# Patient Record
Sex: FEMALE | Race: WHITE | NOT HISPANIC OR LATINO
[De-identification: names, ages, dates, MRNs, and addresses within clinical notes are randomized per-mention and may not be internally consistent; named-entity substitution may affect disease eponyms.]

---
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
## 2019-01-02 LAB
T4 FREE SERPL-MCNC: 1.1 NG/DL
THYROPEROXIDASE AB SERPL IA-ACNC: <10 IU/ML
TSH SERPL-ACNC: 4.75 UIU/ML

## 2019-06-11 ENCOUNTER — RESULT REVIEW (OUTPATIENT)
Age: 19
End: 2019-06-11

## 2019-12-20 ENCOUNTER — TRANSCRIPTION ENCOUNTER (OUTPATIENT)
Age: 19
End: 2019-12-20

## 2019-12-24 ENCOUNTER — APPOINTMENT (OUTPATIENT)
Dept: ENDOCRINOLOGY | Facility: CLINIC | Age: 19
End: 2019-12-24
Payer: COMMERCIAL

## 2019-12-24 VITALS
SYSTOLIC BLOOD PRESSURE: 110 MMHG | BODY MASS INDEX: 24.07 KG/M2 | OXYGEN SATURATION: 99 % | WEIGHT: 141 LBS | HEART RATE: 91 BPM | DIASTOLIC BLOOD PRESSURE: 70 MMHG | HEIGHT: 64 IN

## 2019-12-24 LAB
ACTH SER-ACNC: 6.6 PG/ML
CORTIS SERPL-MCNC: 18.7 UG/DL
FSH SERPL-MCNC: 1.7 IU/L
IGF-1 INTERP: NORMAL
IGF-I BLD-MCNC: 280 NG/ML
LH SERPL-ACNC: 1.4 IU/L
PROLACTIN SERPL-MCNC: 9.8 NG/ML
T4 FREE SERPL-MCNC: 1.1 NG/DL
TSH SERPL-ACNC: 2.2 UIU/ML

## 2019-12-24 PROCEDURE — 99213 OFFICE O/P EST LOW 20 MIN: CPT

## 2019-12-24 NOTE — HISTORY OF PRESENT ILLNESS
[FreeTextEntry1] : Ms. SIMONE HENSLEY is a 18 year old female here for evaluation of pituitary microadenoma.  This was found in MRI in Agus 3, 2018.  MRI done for irregular menstrual cycle. Her first menses started around 11 age, had been mostly regular.  Around Aug of 2017, started getting irregular menses, in which she described that menstrual period was a about 2 weeks late, and in Oct she got it twice a month.  She does not take any medication.  She states that there's no external stress factors.  \par Went to see Dr. Conn, found to have low LH, FSH level and workup included the MRI-pitutiary.  \par \par Brain imaging revealed: Jan 2018, 4.3 x 4.3 mm and could be  compatible with a small pituitary microadenoma versus benign cystic lesion. Repeat imaging was done in December 2018.  Previously noted area of decreased enhancement involving the RIGHT sella turcica as considerably diminished in size.  This finding currently measures approximately 2.3 x 1.6 mm and previously measured approximately 4.3 x 4.3 mm.  He pituitary stalk essentially located and appeared normal.\par \par She denies headache or change of vision. \par \par Symptoms of over or under secretion of pituitary hormones: She noticed no change in her shoe or ring size, no excessive weight gain/loss recently.  She noticed no hirsutism. \par \par Associated visual or neurological symptoms: No headache, no loss of peripheral vision. \par \par Laboratory workup reveled: normal PRL, no IGF-1, no 24 hour urine cortisol/creatinine, AM cortisol/ACTH, normal TSH\par \par She was started on OCP and since then has regular menstrual period. 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  She is studying Kinesiology. \par

## 2019-12-24 NOTE — ASSESSMENT
[FreeTextEntry1] : 1)Pituitary microadenoma\par \par Discuss in detail with patient regarding hormonal testing for pituitary incidentaloma (adenoma) for hyper and hyposecretion.  Discussed the indication for surgical therapy of the pituitary incidentaloma including visual field deficit, other visual abnormalities, lesions abutting or compressing the optic nerves or chiasm on MRI, pituitary apoplexy with visual disturbances as well as hypersecreting tumors other than prolactinoma.  \par Most recent MRI done this year showed lesion in the pituitary gland smaller (2.3x1.6mm vs. 4.3x4.3mm). \par We will repeat the following pituitary panel once a year.\par -Check prolactin, IGF1, and salivary cortisol. \par -Check TSH/Free T4\par -Return in one year for follow up/ \par \par \par \par

## 2019-12-24 NOTE — PHYSICAL EXAM
[Alert] : alert [Well Nourished] : well nourished [No Acute Distress] : no acute distress [EOMI] : extra ocular movement intact [Well Developed] : well developed [Normal Sclera/Conjunctiva] : normal sclera/conjunctiva [No Proptosis] : no proptosis [Normal Oropharynx] : the oropharynx was normal [No Thyroid Nodules] : there were no palpable thyroid nodules [Thyroid Not Enlarged] : the thyroid was not enlarged [No Accessory Muscle Use] : no accessory muscle use [No Respiratory Distress] : no respiratory distress [Clear to Auscultation] : lungs were clear to auscultation bilaterally [Normal S1, S2] : normal S1 and S2 [Normal Rate] : heart rate was normal  [Regular Rhythm] : with a regular rhythm [Pedal Pulses Normal] : the pedal pulses are present [No Edema] : there was no peripheral edema [Normal Bowel Sounds] : normal bowel sounds [Soft] : abdomen soft [Not Tender] : non-tender [Not Distended] : not distended [Post Cervical Nodes] : posterior cervical nodes [Anterior Cervical Nodes] : anterior cervical nodes [Axillary Nodes] : axillary nodes [Normal] : normal and non tender [Spine Straight] : spine straight [No Spinal Tenderness] : no spinal tenderness [Normal Gait] : normal gait [No Stigmata of Cushings Syndrome] : no stigmata of cushings syndrome [Normal Strength/Tone] : muscle strength and tone were normal [No Rash] : no rash [Acanthosis Nigricans] : no acanthosis nigricans [Normal Reflexes] : deep tendon reflexes were 2+ and symmetric [No Tremors] : no tremors [Oriented x3] : oriented to person, place, and time

## 2021-03-05 ENCOUNTER — APPOINTMENT (OUTPATIENT)
Dept: ENDOCRINOLOGY | Facility: CLINIC | Age: 21
End: 2021-03-05
Payer: COMMERCIAL

## 2021-03-05 VITALS
HEART RATE: 77 BPM | SYSTOLIC BLOOD PRESSURE: 116 MMHG | DIASTOLIC BLOOD PRESSURE: 80 MMHG | TEMPERATURE: 98.1 F | WEIGHT: 145 LBS | OXYGEN SATURATION: 98 %

## 2021-03-05 PROCEDURE — 99214 OFFICE O/P EST MOD 30 MIN: CPT

## 2021-03-05 PROCEDURE — 99072 ADDL SUPL MATRL&STAF TM PHE: CPT

## 2021-03-05 RX ORDER — DESOGESTREL AND ETHINYL ESTRADIOL 0.15-0.03
0.15-3 KIT ORAL DAILY
Qty: 3 | Refills: 3 | Status: DISCONTINUED | COMMUNITY
Start: 2018-01-08 | End: 2021-03-05

## 2021-03-05 NOTE — DATA REVIEWED
[FreeTextEntry1] : February 2021\par TSH 2.79\par ACTH 15\par Free T4 1.0\par FSH 6.6\par LH 10.5\par Prolactin 7.8\par A.m. cortisol 14.2.\par \par 12/27/2017\par TSH 1.04\par PROLACTIN 7.5\par HCG <2\par FSH 1.4 MIU/ML\par Lh 0.9 uiu/ml\par \par EXAM: MR BRAIN WAW IC \par \par \par PROCEDURE DATE: 01/03/2018 \par \par INTERPRETATION: History: Irregular menses. \par \par MRI of the sella turcica was performed using coronal and sagittal \par T1-weighted sequence. Coronal T2-weighted sequence films well. Axial T2 and \par FLAIR sequences performed through the brain. The patient was injected with \par approximately 3.5 cc of Gadavist IV with 4 cc of conscious discarded. \par Coronal and sagittal T1-weighted sequence was performed. Axial T1-weighted \par sequence was performed through the brain. \par \par A normal posterior pituitary bright spot is seen. \par \par There is evidence of decreased enhancement seen involving the right \par pituitary gland. This seen posteriorly and is best seen on series 10 images \par 26 and his finding measures approximately 4.3 x 4.3 mm and could be \par compatible with a small pituitary microadenoma versus benign cystic lesion. \par \par The pituitary stalk is centrally located and appears normal.

## 2021-03-05 NOTE — HISTORY OF PRESENT ILLNESS
[FreeTextEntry1] : Ms. SIMONE HENSLEY is a 21 year old female here for evaluation of pituitary microadenoma.  This was found in MRI in Agus 3, 2018.  MRI done for irregular menstrual cycle. Her first menses started around 11 age, had been mostly regular.  Around Aug of 2017, started getting irregular menses, in which she described that menstrual period was a about 2 weeks late, and in Oct she got it twice a month.  She does not take any medication.  She states that there's no external stress factors.  \par Went to see Dr. Conn, found to have low LH, FSH level and workup included the MRI-pitutiary.  \par \par Brain imaging revealed: Jan 2018, 4.3 x 4.3 mm and could be  compatible with a small pituitary microadenoma versus benign cystic lesion. Repeat imaging was done in December 2018.  Previously noted area of decreased enhancement involving the RIGHT sella turcica as considerably diminished in size.  This finding currently measures approximately 2.3 x 1.6 mm and previously measured approximately 4.3 x 4.3 mm.  He pituitary stalk essentially located and appeared normal.\par She denies headache or change of vision. \par Associated visual or neurological symptoms: No headache, no loss of peripheral vision. \par Laboratory workup reveled: normal PRL, no IGF-1, no 24 hour urine cortisol/creatinine, AM cortisol/ACTH, normal TSH\par She was started on OCP and since then has regular menstrual period. \par \par She had a repeat MRI done in December 2018.  The pituitary gland measure approximately 5.7 mm in maximum height which is within normal limits..  The noted area of decreased enhancement involving the right sella turcica has considerably diminished in size.  The finding currently measures 2.3 x 1.6 mm MP recently measured was 4.3 x 4.3 mm.\par \par Patient had a blood clots 2 months ago.  She was found to have homozygous factor V Leiden mutation.  Patient is on Xarelto and is currently being followed by hematologist.  Her OCP was discontinued due to hypercoagulability.\par \par Patient states that after stopping the OCP, her.  Have been a little bit irregular.  Intermittently spotting over the last 2 months.  She had follow-up with her OB/GYN.  Was told that pelvic ultrasound is consistent with possibility of polycystic ovaries.  She had blood work done this morning and will be sent to me a report of that.\par \par She's a college student at [employer].  She is studying Kinesiology. \par

## 2021-03-05 NOTE — ASSESSMENT
[FreeTextEntry1] : Patient is a 21-year-old female with history of factor V Leiden, recently diagnosed in the setting of OCP use and deep vein thrombosis, pituitary microadenoma, nonfunctional, and recently diagnosed with possible polycystic ovarian syndrome.\par \par 1.  Pituitary macroadenoma.\par Discuss in detail with patient regarding hormonal testing for pituitary incidentaloma (adenoma) for hyper and hyposecretion. Discussed the indication for surgical therapy of the pituitary incidentaloma including visual field deficit, other visual abnormalities, lesions abutting or compressing the optic nerves or chiasm on MRI, pituitary apoplexy with visual disturbances as well as hypersecreting tumors other than prolactinoma. \par Most recent MRI done 2019 showed lesion in the pituitary gland smaller (2.3x1.6mm vs. 4.3x4.3mm). \par Can consider repeating MRI in 5 years if there is no symptoms, to avoid excess of gadolinium exposure in this young woman.\par Continue to monitor pituitary following this young woman.\par \par 2.  Polycystic ovarian syndrome\par Patient has irregular menstrual.\par OCP is contraindicated given history of hypercoagulopathy.\par Was seen by OB/GYN.  Had recent blood work done for PCOS work-up.  She will for me the results\par \par 3.  Factor V Leiden\par Avoid OCPs\par Letter given for patient to see if she can get her earlier COVID-19 examination due to hypercoagulopathy.\par \par \par

## 2021-10-12 ENCOUNTER — NON-APPOINTMENT (OUTPATIENT)
Age: 21
End: 2021-10-12

## 2021-10-13 ENCOUNTER — APPOINTMENT (OUTPATIENT)
Dept: CARDIOLOGY | Facility: CLINIC | Age: 21
End: 2021-10-13
Payer: COMMERCIAL

## 2021-10-13 ENCOUNTER — NON-APPOINTMENT (OUTPATIENT)
Age: 21
End: 2021-10-13

## 2021-10-13 VITALS — SYSTOLIC BLOOD PRESSURE: 99 MMHG | DIASTOLIC BLOOD PRESSURE: 70 MMHG

## 2021-10-13 VITALS
HEIGHT: 64 IN | HEART RATE: 88 BPM | SYSTOLIC BLOOD PRESSURE: 100 MMHG | DIASTOLIC BLOOD PRESSURE: 70 MMHG | BODY MASS INDEX: 26.46 KG/M2 | OXYGEN SATURATION: 99 % | WEIGHT: 155 LBS

## 2021-10-13 DIAGNOSIS — R00.0 TACHYCARDIA, UNSPECIFIED: ICD-10-CM

## 2021-10-13 DIAGNOSIS — D68.51 ACTIVATED PROTEIN C RESISTANCE: ICD-10-CM

## 2021-10-13 DIAGNOSIS — I82.401 ACUTE EMBOLISM AND THROMBOSIS OF UNSPECIFIED DEEP VEINS OF RIGHT LOWER EXTREMITY: ICD-10-CM

## 2021-10-13 PROCEDURE — 93000 ELECTROCARDIOGRAM COMPLETE: CPT

## 2021-10-13 PROCEDURE — 99245 OFF/OP CONSLTJ NEW/EST HI 55: CPT

## 2021-10-19 NOTE — DISCUSSION/SUMMARY
[FreeTextEntry1] : She is a 21-year-old with a history of factor V Leiden and leg DVT who presents with a years worth of tachycardia particularly with exertion.  She has not been on oral anticoagulation since June.\par She has no signs of active DVT and her ECG has nonspecific findings.\par I have suggested that she undergo echocardiography in order to exclude any structural heart disease or pericardial disease as a source of her tachycardia and exertional dyspnea.\par A plan exercise stress test will reproduce her symptoms and allow me to exclude arrhythmia.\par I have suggested that she increase her salt and fluid in her diet to prevent orthostasis, if this is contributing.\par Regarding her long-term anticoagulation, she seems like a good candidate for this and I stressed the need to seriously consider long-term anticoagulation. I have ordered a d-Dimer to exclude ongoing clot.  I suggested that she consider another opinion and that she would likely benefit from oral anticoagulation long-term.  I gave her the name of Dr. Alex Parkinson, a vascular cardiologist.

## 2021-10-19 NOTE — HISTORY OF PRESENT ILLNESS
[FreeTextEntry1] : Dear Rosario,\par Thank you for referring her for cardiovascular evaluation.\par She is a 21-year-old with a history of factor V Leiden and DVT who presents with episodes of fast heartbeat.  She describes tachycardia both with and without exercise has been ongoing for months.  She denies any lightheadedness, dizziness or syncope.\par She has no exertional shortness of breath or chest pains.\par She reports having a DVT and was ultimately diagnosed with factor V Leiden.  Approximately 6 months ago she discontinued Xarelto on her own.\par DVT, factor V lyeden off xarleto.\par She has a history of pituitary microadenoma as well.\par Her family history is notable for father with congestive heart failure and mother with hypertension.\par She has a personal history of reflux.\par

## 2021-10-20 ENCOUNTER — NON-APPOINTMENT (OUTPATIENT)
Age: 21
End: 2021-10-20

## 2021-10-20 LAB
DEPRECATED D DIMER PPP IA-ACNC: 296 NG/ML DDU
NT-PROBNP SERPL-MCNC: 16 PG/ML

## 2021-10-21 ENCOUNTER — APPOINTMENT (OUTPATIENT)
Dept: CARDIOLOGY | Facility: CLINIC | Age: 21
End: 2021-10-21
Payer: COMMERCIAL

## 2021-10-21 PROCEDURE — 93325 DOPPLER ECHO COLOR FLOW MAPG: CPT

## 2021-10-21 PROCEDURE — 93320 DOPPLER ECHO COMPLETE: CPT

## 2021-10-21 PROCEDURE — 93351 STRESS TTE COMPLETE: CPT

## 2021-11-22 ENCOUNTER — APPOINTMENT (OUTPATIENT)
Dept: CARDIOLOGY | Facility: CLINIC | Age: 21
End: 2021-11-22

## 2022-07-05 ENCOUNTER — APPOINTMENT (OUTPATIENT)
Dept: ENDOCRINOLOGY | Facility: CLINIC | Age: 22
End: 2022-07-05

## 2022-07-05 VITALS
TEMPERATURE: 98.2 F | WEIGHT: 158 LBS | OXYGEN SATURATION: 98 % | HEART RATE: 88 BPM | SYSTOLIC BLOOD PRESSURE: 120 MMHG | DIASTOLIC BLOOD PRESSURE: 80 MMHG

## 2022-07-05 DIAGNOSIS — N92.6 IRREGULAR MENSTRUATION, UNSPECIFIED: ICD-10-CM

## 2022-07-05 PROCEDURE — 99214 OFFICE O/P EST MOD 30 MIN: CPT

## 2022-07-05 RX ORDER — RIVAROXABAN 10 MG/1
10 TABLET, FILM COATED ORAL
Qty: 30 | Refills: 1 | Status: DISCONTINUED | COMMUNITY
Start: 2021-03-05 | End: 2022-07-05

## 2022-07-05 NOTE — ASSESSMENT
[FreeTextEntry1] : 1.  Pituitary microadenoma.\par \par Discuss in detail with patient regarding hormonal testing for pituitary incidentaloma (adenoma) for hyper and hyposecretion.  Discussed the indication for surgical therapy of the pituitary incidentaloma including visual field deficit, other visual abnormalities, lesions abutting or compressing the optic nerves or chiasm on MRI, pituitary apoplexy with visual disturbances as well as hypersecreting tumors other than prolactinoma.  \par Most recent MRI done this year showed lesion in the pituitary gland smaller (2.3x1.6mm vs. 4.3x4.3mm). \par We will repeat the following pituitary panel once a year.\par Repeat prolactin, IGF-I.\par Repeat TSH and free T4.\par Last imaging done in December 2018, impression Previously noted area of decreased enhancement involving the right sella to sicca has considerably diminished in size.  The current finding currently measures approximately 4.3 x 1.6 mm and Previously measure approximately 4.3 x 4.3 mm.\par \par Repeat MRI of the pituitary gland.\par \par 2.  PCOS\par Currently off OCP secondary to history of hypercoagulability.\par Continue to stay off OCP.\par Menses has been normal\par Repeat A1c level.\par Repeat LH, FSH, and estradiol as well as total testosterone level.\par Last LMP June 13, 2022\par \par

## 2022-07-05 NOTE — HISTORY OF PRESENT ILLNESS
[FreeTextEntry1] : Ms. SIMONE HENSLEY is a 21 year old female here for evaluation of pituitary microadenoma.  This was found in MRI in Agus 3, 2018.  MRI done for irregular menstrual cycle. Her first menses started around 11 age, had been mostly regular.  Around Aug of 2017, started getting irregular menses, in which she described that menstrual period was a about 2 weeks late, and in Oct she got it twice a month.  She does not take any medication.  She states that there's no external stress factors.  \par Went to see Dr. Conn, found to have low LH, FSH level and workup included the MRI-pitutiary.  \par \par Brain imaging revealed: Jan 2018, 4.3 x 4.3 mm and could be  compatible with a small pituitary microadenoma versus benign cystic lesion. Repeat imaging was done in December 2018.  Previously noted area of decreased enhancement involving the RIGHT sella turcica as considerably diminished in size.  This finding currently measures approximately 2.3 x 1.6 mm and previously measured approximately 4.3 x 4.3 mm.  He pituitary stalk essentially located and appeared normal.\par She denies headache or change of vision. \par Associated visual or neurological symptoms: No headache, no loss of peripheral vision. \par Laboratory workup reveled: normal PRL, no IGF-1, no 24 hour urine cortisol/creatinine, AM cortisol/ACTH, normal TSH\par \par She had a repeat MRI done in December 2018.  The pituitary gland measure approximately 5.7 mm in maximum height which is within normal limits..  The noted area of decreased enhancement involving the right sella turcica has considerably diminished in size.  The finding currently measures 2.3 x 1.6 mm MP recently measured was 4.3 x 4.3 mm.\par \par Patient had a blood clots in 2021.  She was found to have homozygous factor V Leiden mutation.  Patient is on Xarelto and is currently being followed by hematologist.  Her OCP was discontinued due to hypercoagulability.\par She was treated with Xarelto but that has been discontinued due to frequent bruising.\par \par Currently off of OCP..  Has been somewhat normal.\par \par Graduated college from [employer].  Currently working as a manager at [employer].\par \par She feels well otherwise.\par

## 2022-07-06 LAB
ANION GAP SERPL CALC-SCNC: 11 MMOL/L
BUN SERPL-MCNC: 12 MG/DL
CALCIUM SERPL-MCNC: 9.8 MG/DL
CHLORIDE SERPL-SCNC: 102 MMOL/L
CO2 SERPL-SCNC: 24 MMOL/L
CREAT SERPL-MCNC: 0.97 MG/DL
EGFR: 84 ML/MIN/1.73M2
ESTRADIOL SERPL-MCNC: 81 PG/ML
FSH SERPL-MCNC: 2.5 IU/L
GLUCOSE SERPL-MCNC: 81 MG/DL
IGF-1 INTERP: NORMAL
IGF-I BLD-MCNC: 205 NG/ML
LH SERPL-ACNC: 7.8 IU/L
POTASSIUM SERPL-SCNC: 4.4 MMOL/L
PROLACTIN SERPL-MCNC: 7.5 NG/ML
SODIUM SERPL-SCNC: 137 MMOL/L
TESTOST SERPL-MCNC: 29.3 NG/DL
TSH SERPL-ACNC: 1.69 UIU/ML

## 2022-09-22 ENCOUNTER — APPOINTMENT (OUTPATIENT)
Dept: ENDOCRINOLOGY | Facility: CLINIC | Age: 22
End: 2022-09-22

## 2023-10-03 ENCOUNTER — APPOINTMENT (OUTPATIENT)
Dept: ENDOCRINOLOGY | Facility: CLINIC | Age: 23
End: 2023-10-03
Payer: COMMERCIAL

## 2023-10-03 VITALS
HEIGHT: 64 IN | SYSTOLIC BLOOD PRESSURE: 120 MMHG | DIASTOLIC BLOOD PRESSURE: 70 MMHG | BODY MASS INDEX: 27.83 KG/M2 | HEART RATE: 86 BPM | WEIGHT: 163 LBS | OXYGEN SATURATION: 99 %

## 2023-10-03 DIAGNOSIS — E23.0 HYPOPITUITARISM: ICD-10-CM

## 2023-10-03 DIAGNOSIS — E28.2 POLYCYSTIC OVARIAN SYNDROME: ICD-10-CM

## 2023-10-03 DIAGNOSIS — D35.2 BENIGN NEOPLASM OF PITUITARY GLAND: ICD-10-CM

## 2023-10-03 DIAGNOSIS — E23.6 OTHER DISORDERS OF PITUITARY GLAND: ICD-10-CM

## 2023-10-03 LAB
ESTIMATED AVERAGE GLUCOSE: 105 MG/DL
HBA1C MFR BLD HPLC: 5.3 %

## 2023-10-03 PROCEDURE — 99214 OFFICE O/P EST MOD 30 MIN: CPT

## 2023-10-05 LAB
ANION GAP SERPL CALC-SCNC: 13 MMOL/L
BUN SERPL-MCNC: 12 MG/DL
CALCIUM SERPL-MCNC: 9.7 MG/DL
CHLORIDE SERPL-SCNC: 103 MMOL/L
CHOLEST SERPL-MCNC: 152 MG/DL
CO2 SERPL-SCNC: 24 MMOL/L
CREAT SERPL-MCNC: 0.98 MG/DL
EGFR: 83 ML/MIN/1.73M2
ESTRADIOL SERPL-MCNC: 148 PG/ML
FSH SERPL-MCNC: 1.7 IU/L
GLUCOSE SERPL-MCNC: 105 MG/DL
HDLC SERPL-MCNC: 65 MG/DL
LDLC SERPL CALC-MCNC: 74 MG/DL
LH SERPL-ACNC: 2 IU/L
NONHDLC SERPL-MCNC: 87 MG/DL
POTASSIUM SERPL-SCNC: 4.3 MMOL/L
PROLACTIN SERPL-MCNC: 8.4 NG/ML
SODIUM SERPL-SCNC: 141 MMOL/L
T4 FREE SERPL-MCNC: 1.3 NG/DL
TRIGL SERPL-MCNC: 70 MG/DL
TSH SERPL-ACNC: 1.27 UIU/ML

## 2024-01-08 ENCOUNTER — OUTPATIENT (OUTPATIENT)
Dept: OUTPATIENT SERVICES | Facility: HOSPITAL | Age: 24
LOS: 1 days | End: 2024-01-08
Payer: COMMERCIAL

## 2024-01-08 ENCOUNTER — APPOINTMENT (OUTPATIENT)
Dept: MRI IMAGING | Facility: CLINIC | Age: 24
End: 2024-01-08
Payer: COMMERCIAL

## 2024-01-08 DIAGNOSIS — D35.2 BENIGN NEOPLASM OF PITUITARY GLAND: ICD-10-CM

## 2024-01-08 PROCEDURE — 70553 MRI BRAIN STEM W/O & W/DYE: CPT

## 2024-01-08 PROCEDURE — A9585: CPT

## 2024-01-08 PROCEDURE — 70553 MRI BRAIN STEM W/O & W/DYE: CPT | Mod: 26

## 2024-10-07 ENCOUNTER — APPOINTMENT (OUTPATIENT)
Dept: ENDOCRINOLOGY | Facility: CLINIC | Age: 24
End: 2024-10-07
Payer: COMMERCIAL

## 2024-10-07 VITALS
HEIGHT: 64 IN | SYSTOLIC BLOOD PRESSURE: 100 MMHG | HEART RATE: 75 BPM | WEIGHT: 160 LBS | DIASTOLIC BLOOD PRESSURE: 68 MMHG | OXYGEN SATURATION: 97 % | BODY MASS INDEX: 27.31 KG/M2

## 2024-10-07 DIAGNOSIS — E23.0 HYPOPITUITARISM: ICD-10-CM

## 2024-10-07 DIAGNOSIS — E23.6 OTHER DISORDERS OF PITUITARY GLAND: ICD-10-CM

## 2024-10-07 DIAGNOSIS — D35.2 BENIGN NEOPLASM OF PITUITARY GLAND: ICD-10-CM

## 2024-10-07 PROCEDURE — 99214 OFFICE O/P EST MOD 30 MIN: CPT

## 2025-04-22 ENCOUNTER — APPOINTMENT (OUTPATIENT)
Dept: ENDOCRINOLOGY | Facility: CLINIC | Age: 25
End: 2025-04-22